# Patient Record
Sex: FEMALE | Race: OTHER | NOT HISPANIC OR LATINO | URBAN - METROPOLITAN AREA
[De-identification: names, ages, dates, MRNs, and addresses within clinical notes are randomized per-mention and may not be internally consistent; named-entity substitution may affect disease eponyms.]

---

## 2017-03-29 ENCOUNTER — EMERGENCY (EMERGENCY)
Facility: HOSPITAL | Age: 37
LOS: 1 days | Discharge: HOME | End: 2017-03-29
Attending: EMERGENCY MEDICINE | Admitting: EMERGENCY MEDICINE
Payer: COMMERCIAL

## 2017-03-29 VITALS
TEMPERATURE: 98 F | OXYGEN SATURATION: 100 % | SYSTOLIC BLOOD PRESSURE: 95 MMHG | HEART RATE: 105 BPM | RESPIRATION RATE: 20 BRPM | DIASTOLIC BLOOD PRESSURE: 56 MMHG

## 2017-03-29 LAB
ALBUMIN SERPL ELPH-MCNC: 4.2 G/DL — SIGNIFICANT CHANGE UP (ref 3.3–5)
ALP SERPL-CCNC: 50 U/L — SIGNIFICANT CHANGE UP (ref 40–120)
ALT FLD-CCNC: 14 U/L — SIGNIFICANT CHANGE UP (ref 4–33)
APPEARANCE UR: SIGNIFICANT CHANGE UP
AST SERPL-CCNC: 21 U/L — SIGNIFICANT CHANGE UP (ref 4–32)
BACTERIA # UR AUTO: SIGNIFICANT CHANGE UP
BASE EXCESS BLDV CALC-SCNC: 0.5 MMOL/L — SIGNIFICANT CHANGE UP
BASOPHILS # BLD AUTO: 0 K/UL — SIGNIFICANT CHANGE UP (ref 0–0.2)
BASOPHILS NFR BLD AUTO: 0 % — SIGNIFICANT CHANGE UP (ref 0–2)
BILIRUB SERPL-MCNC: 0.3 MG/DL — SIGNIFICANT CHANGE UP (ref 0.2–1.2)
BILIRUB UR-MCNC: NEGATIVE — SIGNIFICANT CHANGE UP
BLOOD GAS VENOUS - CREATININE: 0.81 MG/DL — SIGNIFICANT CHANGE UP (ref 0.5–1.3)
BLOOD UR QL VISUAL: HIGH
BUN SERPL-MCNC: 16 MG/DL — SIGNIFICANT CHANGE UP (ref 7–23)
CALCIUM SERPL-MCNC: 8.4 MG/DL — SIGNIFICANT CHANGE UP (ref 8.4–10.5)
CHLORIDE BLDV-SCNC: 106 MMOL/L — SIGNIFICANT CHANGE UP (ref 96–108)
CHLORIDE SERPL-SCNC: 99 MMOL/L — SIGNIFICANT CHANGE UP (ref 98–107)
CO2 SERPL-SCNC: 22 MMOL/L — SIGNIFICANT CHANGE UP (ref 22–31)
COLOR SPEC: SIGNIFICANT CHANGE UP
CREAT SERPL-MCNC: 0.82 MG/DL — SIGNIFICANT CHANGE UP (ref 0.5–1.3)
D DIMER BLD IA.RAPID-MCNC: 351 NG/ML — SIGNIFICANT CHANGE UP
EOSINOPHIL # BLD AUTO: 0 K/UL — SIGNIFICANT CHANGE UP (ref 0–0.5)
EOSINOPHIL NFR BLD AUTO: 0 % — SIGNIFICANT CHANGE UP (ref 0–6)
GAS PNL BLDV: 130 MMOL/L — LOW (ref 136–146)
GLUCOSE BLDV-MCNC: 116 — HIGH (ref 70–99)
GLUCOSE SERPL-MCNC: 114 MG/DL — HIGH (ref 70–99)
GLUCOSE UR-MCNC: NEGATIVE — SIGNIFICANT CHANGE UP
HCO3 BLDV-SCNC: 24 MMOL/L — SIGNIFICANT CHANGE UP (ref 20–27)
HCT VFR BLD CALC: 31.7 % — LOW (ref 34.5–45)
HCT VFR BLDV CALC: 27.6 % — LOW (ref 34.5–45)
HGB BLD-MCNC: 10 G/DL — LOW (ref 11.5–15.5)
HGB BLDV-MCNC: 8.9 G/DL — LOW (ref 11.5–15.5)
IMM GRANULOCYTES NFR BLD AUTO: 0.3 % — SIGNIFICANT CHANGE UP (ref 0–1.5)
KETONES UR-MCNC: NEGATIVE — SIGNIFICANT CHANGE UP
LACTATE BLDV-MCNC: 1.6 MMOL/L — SIGNIFICANT CHANGE UP (ref 0.5–2)
LEUKOCYTE ESTERASE UR-ACNC: NEGATIVE — SIGNIFICANT CHANGE UP
LIDOCAIN IGE QN: 14.7 U/L — SIGNIFICANT CHANGE UP (ref 7–60)
LYMPHOCYTES # BLD AUTO: 0.74 K/UL — LOW (ref 1–3.3)
LYMPHOCYTES # BLD AUTO: 7.8 % — LOW (ref 13–44)
MCHC RBC-ENTMCNC: 24.2 PG — LOW (ref 27–34)
MCHC RBC-ENTMCNC: 31.5 % — LOW (ref 32–36)
MCV RBC AUTO: 76.8 FL — LOW (ref 80–100)
MONOCYTES # BLD AUTO: 0.48 K/UL — SIGNIFICANT CHANGE UP (ref 0–0.9)
MONOCYTES NFR BLD AUTO: 5.1 % — SIGNIFICANT CHANGE UP (ref 2–14)
MUCOUS THREADS # UR AUTO: SIGNIFICANT CHANGE UP
NEUTROPHILS # BLD AUTO: 8.22 K/UL — HIGH (ref 1.8–7.4)
NEUTROPHILS NFR BLD AUTO: 86.8 % — HIGH (ref 43–77)
NITRITE UR-MCNC: NEGATIVE — SIGNIFICANT CHANGE UP
PCO2 BLDV: 45 MMHG — SIGNIFICANT CHANGE UP (ref 41–51)
PH BLDV: 7.37 PH — SIGNIFICANT CHANGE UP (ref 7.32–7.43)
PH UR: 6.5 — SIGNIFICANT CHANGE UP (ref 4.6–8)
PLATELET # BLD AUTO: 292 K/UL — SIGNIFICANT CHANGE UP (ref 150–400)
PMV BLD: 10.1 FL — SIGNIFICANT CHANGE UP (ref 7–13)
PO2 BLDV: 29 MMHG — LOW (ref 35–40)
POTASSIUM BLDV-SCNC: 3.5 MMOL/L — SIGNIFICANT CHANGE UP (ref 3.4–4.5)
POTASSIUM SERPL-MCNC: 3.9 MMOL/L — SIGNIFICANT CHANGE UP (ref 3.5–5.3)
POTASSIUM SERPL-SCNC: 3.9 MMOL/L — SIGNIFICANT CHANGE UP (ref 3.5–5.3)
PROT SERPL-MCNC: 7.6 G/DL — SIGNIFICANT CHANGE UP (ref 6–8.3)
PROT UR-MCNC: 20 — SIGNIFICANT CHANGE UP
RBC # BLD: 4.13 M/UL — SIGNIFICANT CHANGE UP (ref 3.8–5.2)
RBC # FLD: 16.4 % — HIGH (ref 10.3–14.5)
RBC CASTS # UR COMP ASSIST: SIGNIFICANT CHANGE UP (ref 0–?)
SAO2 % BLDV: 46.4 % — LOW (ref 60–85)
SODIUM SERPL-SCNC: 138 MMOL/L — SIGNIFICANT CHANGE UP (ref 135–145)
SP GR SPEC: 1.01 — SIGNIFICANT CHANGE UP (ref 1–1.03)
SQUAMOUS # UR AUTO: SIGNIFICANT CHANGE UP
UROBILINOGEN FLD QL: NORMAL E.U. — SIGNIFICANT CHANGE UP (ref 0.1–0.2)
WBC # BLD: 9.47 K/UL — SIGNIFICANT CHANGE UP (ref 3.8–10.5)
WBC # FLD AUTO: 9.47 K/UL — SIGNIFICANT CHANGE UP (ref 3.8–10.5)
WBC UR QL: SIGNIFICANT CHANGE UP (ref 0–?)

## 2017-03-29 PROCEDURE — 71275 CT ANGIOGRAPHY CHEST: CPT | Mod: 26

## 2017-03-29 PROCEDURE — 99285 EMERGENCY DEPT VISIT HI MDM: CPT | Mod: 25

## 2017-03-29 PROCEDURE — 71020: CPT | Mod: 26

## 2017-03-29 RX ORDER — SODIUM CHLORIDE 9 MG/ML
1000 INJECTION INTRAMUSCULAR; INTRAVENOUS; SUBCUTANEOUS ONCE
Qty: 0 | Refills: 0 | Status: COMPLETED | OUTPATIENT
Start: 2017-03-29 | End: 2017-03-29

## 2017-03-29 RX ADMIN — SODIUM CHLORIDE 1000 MILLILITER(S): 9 INJECTION INTRAMUSCULAR; INTRAVENOUS; SUBCUTANEOUS at 18:52

## 2017-03-29 NOTE — ED ADULT NURSE REASSESSMENT NOTE - NS ED NURSE REASSESS COMMENT FT1
report taking from previous RN for mult. syncope in the past and 3 episode today, denies HA,CP, + dizziness lab sent S.L.. to right Ac 20g angio cath IV fluids in progress, pending dispo.     Sarah Zaragoza RN

## 2017-03-29 NOTE — ED PROVIDER NOTE - OBJECTIVE STATEMENT
35 y/o F with PMH of anemia p/w syncope x 3 earlier today. Pt reports sitting, feeling nauseous and diaphoretic, LOC for 30 seconds with complete return to baseline. Pt also c/o dull achy epigastric pain that has resolved earlier in the day. Pt denies fevers, chills, cough, dysuria, hematuria, hx of dvt/pe, back pain, diarrhea, constipation, SOB, dyspnea on exertion. Pt reports being on her period currently, but states that she only has mild bleeding, no hx of heavy periods.

## 2017-03-29 NOTE — ED ADULT TRIAGE NOTE - CHIEF COMPLAINT QUOTE
Pt states has history of amenia ,states woke up with abdominal pain and nausea last night but did not vomit . Pt reports passing out today . Pt is hypotensive in triage b/p 95/56. Pt is currently on her menstrual cycle but states is not bleeding more then usual.

## 2017-03-29 NOTE — ED PROVIDER NOTE - ATTENDING CONTRIBUTION TO CARE
Locurto  pt presents with syncope x 3 assoc with nausea  no CP or palpitations  no diarrhea or rectal bleeding  no medications  Pt with regular menses  regular po intake no polyuria  Pt had mild abd pain early this am but not at time of dizziness  Exam pt in NAD clear lungs  card S1S2  no m abd benign   no focal strength defect   pt significantly orthostatic  on exam   110/54  96 supine  83/51 126 standing  felt weak Locurto  pt presents with syncope x 3 assoc with nausea  no CP or palpitations  no diarrhea or rectal bleeding  no medications  Pt with regular menses  regular po intake no polyuria  Pt had mild abd pain early this am but not at time of dizziness  Exam pt in NAD clear lungs  card S1S2  no m abd benign   no focal strength defect   pt significantly orthostatic  on exam   110/54  96 supine  83/51 126 standing  felt weak  bedside ECHO unremarkable, FAST negative  d dimer positive  no PE  admit to obs for monitoring continued gentle hydration and am vitals and cortisol  pt orthostatic  no story for volume loss infection  allergic rxn        CT negative ECHO not suggestive of LV dysfunction

## 2017-03-30 VITALS
HEART RATE: 81 BPM | DIASTOLIC BLOOD PRESSURE: 72 MMHG | SYSTOLIC BLOOD PRESSURE: 116 MMHG | TEMPERATURE: 99 F | OXYGEN SATURATION: 100 % | RESPIRATION RATE: 16 BRPM

## 2017-03-30 DIAGNOSIS — Z98.891 HISTORY OF UTERINE SCAR FROM PREVIOUS SURGERY: Chronic | ICD-10-CM

## 2017-03-30 LAB
BASOPHILS # BLD AUTO: 0 K/UL — SIGNIFICANT CHANGE UP (ref 0–0.2)
BASOPHILS NFR BLD AUTO: 0 % — SIGNIFICANT CHANGE UP (ref 0–2)
BUN SERPL-MCNC: 9 MG/DL — SIGNIFICANT CHANGE UP (ref 7–23)
CALCIUM SERPL-MCNC: 7.6 MG/DL — LOW (ref 8.4–10.5)
CHLORIDE SERPL-SCNC: 108 MMOL/L — HIGH (ref 98–107)
CK MB BLD-MCNC: 1 NG/ML — SIGNIFICANT CHANGE UP (ref 1–4.7)
CK MB BLD-MCNC: 1 NG/ML — SIGNIFICANT CHANGE UP (ref 1–4.7)
CK MB BLD-MCNC: SIGNIFICANT CHANGE UP (ref 0–2.5)
CK MB BLD-MCNC: SIGNIFICANT CHANGE UP (ref 0–2.5)
CK SERPL-CCNC: 46 U/L — SIGNIFICANT CHANGE UP (ref 25–170)
CK SERPL-CCNC: 60 U/L — SIGNIFICANT CHANGE UP (ref 25–170)
CO2 SERPL-SCNC: 22 MMOL/L — SIGNIFICANT CHANGE UP (ref 22–31)
CORTIS SERPL-MCNC: 5.3 UG/DL — SIGNIFICANT CHANGE UP (ref 2.7–18.4)
CREAT SERPL-MCNC: 0.62 MG/DL — SIGNIFICANT CHANGE UP (ref 0.5–1.3)
EOSINOPHIL # BLD AUTO: 0.05 K/UL — SIGNIFICANT CHANGE UP (ref 0–0.5)
EOSINOPHIL NFR BLD AUTO: 1 % — SIGNIFICANT CHANGE UP (ref 0–6)
GLUCOSE SERPL-MCNC: 94 MG/DL — SIGNIFICANT CHANGE UP (ref 70–99)
HCT VFR BLD CALC: 28.5 % — LOW (ref 34.5–45)
HGB BLD-MCNC: 9 G/DL — LOW (ref 11.5–15.5)
IMM GRANULOCYTES NFR BLD AUTO: 0 % — SIGNIFICANT CHANGE UP (ref 0–1.5)
LYMPHOCYTES # BLD AUTO: 1.34 K/UL — SIGNIFICANT CHANGE UP (ref 1–3.3)
LYMPHOCYTES # BLD AUTO: 25.8 % — SIGNIFICANT CHANGE UP (ref 13–44)
MCHC RBC-ENTMCNC: 24.7 PG — LOW (ref 27–34)
MCHC RBC-ENTMCNC: 31.6 % — LOW (ref 32–36)
MCV RBC AUTO: 78.3 FL — LOW (ref 80–100)
MONOCYTES # BLD AUTO: 0.29 K/UL — SIGNIFICANT CHANGE UP (ref 0–0.9)
MONOCYTES NFR BLD AUTO: 5.6 % — SIGNIFICANT CHANGE UP (ref 2–14)
NEUTROPHILS # BLD AUTO: 3.52 K/UL — SIGNIFICANT CHANGE UP (ref 1.8–7.4)
NEUTROPHILS NFR BLD AUTO: 67.6 % — SIGNIFICANT CHANGE UP (ref 43–77)
OB PNL STL: NEGATIVE — SIGNIFICANT CHANGE UP
PLATELET # BLD AUTO: 251 K/UL — SIGNIFICANT CHANGE UP (ref 150–400)
PMV BLD: 10.2 FL — SIGNIFICANT CHANGE UP (ref 7–13)
POTASSIUM SERPL-MCNC: 3.4 MMOL/L — LOW (ref 3.5–5.3)
POTASSIUM SERPL-SCNC: 3.4 MMOL/L — LOW (ref 3.5–5.3)
RBC # BLD: 3.64 M/UL — LOW (ref 3.8–5.2)
RBC # FLD: 16.8 % — HIGH (ref 10.3–14.5)
SODIUM SERPL-SCNC: 144 MMOL/L — SIGNIFICANT CHANGE UP (ref 135–145)
TROPONIN T SERPL-MCNC: < 0.06 NG/ML — SIGNIFICANT CHANGE UP (ref 0–0.06)
TROPONIN T SERPL-MCNC: < 0.06 NG/ML — SIGNIFICANT CHANGE UP (ref 0–0.06)
TSH SERPL-MCNC: 0.8 UIU/ML — SIGNIFICANT CHANGE UP (ref 0.27–4.2)
WBC # BLD: 5.2 K/UL — SIGNIFICANT CHANGE UP (ref 3.8–10.5)
WBC # FLD AUTO: 5.2 K/UL — SIGNIFICANT CHANGE UP (ref 3.8–10.5)

## 2017-03-30 PROCEDURE — 76705 ECHO EXAM OF ABDOMEN: CPT | Mod: 26

## 2017-03-30 PROCEDURE — 99234 HOSP IP/OBS SM DT SF/LOW 45: CPT | Mod: 25

## 2017-03-30 PROCEDURE — 93308 TTE F-UP OR LMTD: CPT | Mod: 26

## 2017-03-30 PROCEDURE — 93971 EXTREMITY STUDY: CPT | Mod: 26,LT

## 2017-03-30 PROCEDURE — 93306 TTE W/DOPPLER COMPLETE: CPT | Mod: 26

## 2017-03-30 RX ORDER — SODIUM CHLORIDE 9 MG/ML
1000 INJECTION INTRAMUSCULAR; INTRAVENOUS; SUBCUTANEOUS
Qty: 0 | Refills: 0 | Status: DISCONTINUED | OUTPATIENT
Start: 2017-03-30 | End: 2017-04-02

## 2017-03-30 RX ORDER — FAMOTIDINE 10 MG/ML
20 INJECTION INTRAVENOUS ONCE
Qty: 0 | Refills: 0 | Status: COMPLETED | OUTPATIENT
Start: 2017-03-30 | End: 2017-03-30

## 2017-03-30 RX ADMIN — SODIUM CHLORIDE 125 MILLILITER(S): 9 INJECTION INTRAMUSCULAR; INTRAVENOUS; SUBCUTANEOUS at 01:30

## 2017-03-30 RX ADMIN — SODIUM CHLORIDE 125 MILLILITER(S): 9 INJECTION INTRAMUSCULAR; INTRAVENOUS; SUBCUTANEOUS at 10:00

## 2017-03-30 RX ADMIN — FAMOTIDINE 20 MILLIGRAM(S): 10 INJECTION INTRAVENOUS at 15:30

## 2017-03-30 NOTE — ED CDU PROVIDER NOTE - PROGRESS NOTE DETAILS
Yolie RUSSELL: CDU PROGRESS NOTE: pt feels much better, no overnight issues, pending echo Yolie RUSSELL: CDU dispo NOTE: Pt has normal echo and neg us of rt leg, no cause of recurrent syncope was eluded. I have extensive discussion with patient and gave option to stay fro further testing vs outpatient testing and hydration. Pt chose outpatient testing, advised to have holter monitoring, drink fluids like gatorade and coconut water and in c/o worsening symptoms or recurrent syncope return to the ED. Pt was also told that she might need EP study in the future DIAMOND Finnegan: Results and discharge instructions reviewed with patient. Pt agrees to follow up for Holter monitor and further studies as outpatients. Pt understands to increase her fluid and return precautions. Pt feels well. VSS. Pt stable for discharge.

## 2017-03-30 NOTE — ED CDU PROVIDER NOTE - PLAN OF CARE
Follow up with your Primary Medical Doctor/ and or Cardiology within 2-3days for Holter monitor and possible EP study in future. If results or reports were given to you, show copies of your reports given to you. Take all of your medications as previously prescribed. Increase fluid intake, drink Gatorade, coconut water. If any worsening, concerning or new symptoms return to the ED.

## 2017-03-30 NOTE — ED CDU PROVIDER NOTE - MEDICAL DECISION MAKING DETAILS
Pt. was evaluated in the ED and sent to CDU for cardiac telemetry monitoring, CE #2, echo, am cortisol level, observation and reassessment.  At present, pt...

## 2017-03-30 NOTE — ED CDU PROVIDER NOTE - ATTENDING CONTRIBUTION TO CARE
Locurto  pt placed in CDU s/p syncope   Pt presented with orthostatic hypotension and nonspecific TW changes on EKG  no clear clinical cause of volume depletion exam otherwise unremarkable with benign abd   nl cardiopulmonary exam  nonfocal neuro  bedside FAST negative positive dimer with CTA negative for PE  bedside ECHO and labs nondiagnostic    CDU for monitoring   further evaluation Locurto  pt placed in CDU s/p syncope   Pt presented with orthostatic hypotension and nonspecific TW changes on EKG  no clear clinical cause of volume depletion exam otherwise unremarkable with benign abd   nl cardiopulmonary exam  nonfocal neuro  bedside FAST negative positive dimer with CTA negative for PE  bedside ECHO and labs nondiagnostic    CDU for monitoring   further evaluation repeat CBC am cortisol

## 2017-03-30 NOTE — ED CDU PROVIDER NOTE - OBJECTIVE STATEMENT
35 y/o F PMH anemia c/o 3 episodes of syncope today. Pt states at approx 0300 this am she experienced epigastric pain and nausea. After walking to the bathroom pt began to feel cold sweats and chills and guided herself to seated position before losing consciousness. Pt states she had LOC for few seconds. At approx 1200 pt had similar episode witnessed by mother in law, again no head trauma, able to guide self to floor. While waiting in ED triage pt had 3rd episode of syncope while sitting in chairs. Denies fever, CP, SOB, vomiting, diarrhea, dysuria, recent travel, calf pain/LE edema, hormone use, visual changes.  At present pt is asymptomatic and epigastric pain completely resolved.

## 2017-03-30 NOTE — ED PROCEDURE NOTE - PROCEDURE ADDITIONAL DETAILS
grossly nl LV function fractional shortening 37%  nl rv free wall velocity  RV/LV 83%  no septal D  IVC not dilated  no effusion  no JOSE MANUEL seen

## 2017-04-04 PROBLEM — Z00.00 ENCOUNTER FOR PREVENTIVE HEALTH EXAMINATION: Status: ACTIVE | Noted: 2017-04-04

## 2017-04-05 ENCOUNTER — APPOINTMENT (OUTPATIENT)
Dept: SURGERY | Facility: CLINIC | Age: 37
End: 2017-04-05

## 2017-04-05 ENCOUNTER — LABORATORY RESULT (OUTPATIENT)
Age: 37
End: 2017-04-05

## 2017-04-05 VITALS
WEIGHT: 170 LBS | HEIGHT: 64 IN | DIASTOLIC BLOOD PRESSURE: 74 MMHG | HEART RATE: 81 BPM | BODY MASS INDEX: 29.02 KG/M2 | SYSTOLIC BLOOD PRESSURE: 132 MMHG

## 2017-04-05 DIAGNOSIS — K80.20 CALCULUS OF GALLBLADDER W/OUT CHOLECYSTITIS W/OUT OBSTRUCTION: ICD-10-CM

## 2017-04-05 DIAGNOSIS — E04.2 NONTOXIC MULTINODULAR GOITER: ICD-10-CM

## 2017-04-11 ENCOUNTER — TRANSCRIPTION ENCOUNTER (OUTPATIENT)
Age: 37
End: 2017-04-11

## 2017-10-11 ENCOUNTER — APPOINTMENT (OUTPATIENT)
Dept: SURGERY | Facility: CLINIC | Age: 37
End: 2017-10-11

## 2019-10-13 ENCOUNTER — TRANSCRIPTION ENCOUNTER (OUTPATIENT)
Age: 39
End: 2019-10-13

## 2019-10-13 ENCOUNTER — INPATIENT (INPATIENT)
Facility: HOSPITAL | Age: 39
LOS: 1 days | Discharge: OTHER INSTITUTION | DRG: 419 | End: 2019-10-15
Attending: SURGERY | Admitting: SURGERY
Payer: COMMERCIAL

## 2019-10-13 VITALS
HEART RATE: 62 BPM | WEIGHT: 169.98 LBS | RESPIRATION RATE: 18 BRPM | HEIGHT: 64 IN | SYSTOLIC BLOOD PRESSURE: 159 MMHG | DIASTOLIC BLOOD PRESSURE: 85 MMHG | OXYGEN SATURATION: 100 % | TEMPERATURE: 99 F

## 2019-10-13 DIAGNOSIS — Z98.891 HISTORY OF UTERINE SCAR FROM PREVIOUS SURGERY: Chronic | ICD-10-CM

## 2019-10-13 DIAGNOSIS — K80.20 CALCULUS OF GALLBLADDER WITHOUT CHOLECYSTITIS WITHOUT OBSTRUCTION: ICD-10-CM

## 2019-10-13 PROBLEM — D64.9 ANEMIA, UNSPECIFIED: Chronic | Status: ACTIVE | Noted: 2017-03-29

## 2019-10-13 LAB
ALBUMIN SERPL ELPH-MCNC: 4.2 G/DL — SIGNIFICANT CHANGE UP (ref 3.3–5)
ALP SERPL-CCNC: 55 U/L — SIGNIFICANT CHANGE UP (ref 40–120)
ALT FLD-CCNC: 17 U/L — SIGNIFICANT CHANGE UP (ref 10–45)
ANION GAP SERPL CALC-SCNC: 13 MMOL/L — SIGNIFICANT CHANGE UP (ref 5–17)
AST SERPL-CCNC: 17 U/L — SIGNIFICANT CHANGE UP (ref 10–40)
BASOPHILS # BLD AUTO: 0.01 K/UL — SIGNIFICANT CHANGE UP (ref 0–0.2)
BASOPHILS NFR BLD AUTO: 0.1 % — SIGNIFICANT CHANGE UP (ref 0–2)
BILIRUB SERPL-MCNC: 0.2 MG/DL — SIGNIFICANT CHANGE UP (ref 0.2–1.2)
BUN SERPL-MCNC: 10 MG/DL — SIGNIFICANT CHANGE UP (ref 7–23)
CALCIUM SERPL-MCNC: 9.1 MG/DL — SIGNIFICANT CHANGE UP (ref 8.4–10.5)
CHLORIDE SERPL-SCNC: 100 MMOL/L — SIGNIFICANT CHANGE UP (ref 96–108)
CO2 SERPL-SCNC: 21 MMOL/L — LOW (ref 22–31)
CREAT SERPL-MCNC: 0.71 MG/DL — SIGNIFICANT CHANGE UP (ref 0.5–1.3)
EOSINOPHIL # BLD AUTO: 0.09 K/UL — SIGNIFICANT CHANGE UP (ref 0–0.5)
EOSINOPHIL NFR BLD AUTO: 1.1 % — SIGNIFICANT CHANGE UP (ref 0–6)
GLUCOSE SERPL-MCNC: 107 MG/DL — HIGH (ref 70–99)
HCT VFR BLD CALC: 35.1 % — SIGNIFICANT CHANGE UP (ref 34.5–45)
HGB BLD-MCNC: 11.3 G/DL — LOW (ref 11.5–15.5)
IMM GRANULOCYTES NFR BLD AUTO: 0.5 % — SIGNIFICANT CHANGE UP (ref 0–1.5)
LIDOCAIN IGE QN: 16 U/L — SIGNIFICANT CHANGE UP (ref 7–60)
LYMPHOCYTES # BLD AUTO: 1.5 K/UL — SIGNIFICANT CHANGE UP (ref 1–3.3)
LYMPHOCYTES # BLD AUTO: 18.7 % — SIGNIFICANT CHANGE UP (ref 13–44)
MCHC RBC-ENTMCNC: 25.9 PG — LOW (ref 27–34)
MCHC RBC-ENTMCNC: 32.2 GM/DL — SIGNIFICANT CHANGE UP (ref 32–36)
MCV RBC AUTO: 80.3 FL — SIGNIFICANT CHANGE UP (ref 80–100)
MONOCYTES # BLD AUTO: 0.45 K/UL — SIGNIFICANT CHANGE UP (ref 0–0.9)
MONOCYTES NFR BLD AUTO: 5.6 % — SIGNIFICANT CHANGE UP (ref 2–14)
NEUTROPHILS # BLD AUTO: 5.93 K/UL — SIGNIFICANT CHANGE UP (ref 1.8–7.4)
NEUTROPHILS NFR BLD AUTO: 74 % — SIGNIFICANT CHANGE UP (ref 43–77)
NRBC # BLD: 0 /100 WBCS — SIGNIFICANT CHANGE UP (ref 0–0)
PLATELET # BLD AUTO: 310 K/UL — SIGNIFICANT CHANGE UP (ref 150–400)
POTASSIUM SERPL-MCNC: 4 MMOL/L — SIGNIFICANT CHANGE UP (ref 3.5–5.3)
POTASSIUM SERPL-SCNC: 4 MMOL/L — SIGNIFICANT CHANGE UP (ref 3.5–5.3)
PROT SERPL-MCNC: 7.5 G/DL — SIGNIFICANT CHANGE UP (ref 6–8.3)
RBC # BLD: 4.37 M/UL — SIGNIFICANT CHANGE UP (ref 3.8–5.2)
RBC # FLD: 14.9 % — HIGH (ref 10.3–14.5)
SODIUM SERPL-SCNC: 134 MMOL/L — LOW (ref 135–145)
WBC # BLD: 8.02 K/UL — SIGNIFICANT CHANGE UP (ref 3.8–10.5)
WBC # FLD AUTO: 8.02 K/UL — SIGNIFICANT CHANGE UP (ref 3.8–10.5)

## 2019-10-13 PROCEDURE — 99285 EMERGENCY DEPT VISIT HI MDM: CPT

## 2019-10-13 PROCEDURE — 76705 ECHO EXAM OF ABDOMEN: CPT | Mod: 26,RT

## 2019-10-13 RX ORDER — CEFOTETAN DISODIUM 1 G
VIAL (EA) INJECTION
Refills: 0 | Status: DISCONTINUED | OUTPATIENT
Start: 2019-10-13 | End: 2019-10-14

## 2019-10-13 RX ORDER — ACETAMINOPHEN 500 MG
1000 TABLET ORAL ONCE
Refills: 0 | Status: COMPLETED | OUTPATIENT
Start: 2019-10-13 | End: 2019-10-13

## 2019-10-13 RX ORDER — ENOXAPARIN SODIUM 100 MG/ML
40 INJECTION SUBCUTANEOUS DAILY
Refills: 0 | Status: DISCONTINUED | OUTPATIENT
Start: 2019-10-13 | End: 2019-10-14

## 2019-10-13 RX ORDER — METOCLOPRAMIDE HCL 10 MG
10 TABLET ORAL ONCE
Refills: 0 | Status: DISCONTINUED | OUTPATIENT
Start: 2019-10-13 | End: 2019-10-13

## 2019-10-13 RX ORDER — ONDANSETRON 8 MG/1
4 TABLET, FILM COATED ORAL ONCE
Refills: 0 | Status: DISCONTINUED | OUTPATIENT
Start: 2019-10-13 | End: 2019-10-13

## 2019-10-13 RX ORDER — SODIUM CHLORIDE 9 MG/ML
1000 INJECTION, SOLUTION INTRAVENOUS
Refills: 0 | Status: DISCONTINUED | OUTPATIENT
Start: 2019-10-13 | End: 2019-10-14

## 2019-10-13 RX ORDER — HYDROMORPHONE HYDROCHLORIDE 2 MG/ML
1 INJECTION INTRAMUSCULAR; INTRAVENOUS; SUBCUTANEOUS EVERY 6 HOURS
Refills: 0 | Status: DISCONTINUED | OUTPATIENT
Start: 2019-10-13 | End: 2019-10-14

## 2019-10-13 RX ORDER — CEFOTETAN DISODIUM 1 G
2 VIAL (EA) INJECTION ONCE
Refills: 0 | Status: COMPLETED | OUTPATIENT
Start: 2019-10-13 | End: 2019-10-13

## 2019-10-13 RX ORDER — HYDROMORPHONE HYDROCHLORIDE 2 MG/ML
0.5 INJECTION INTRAMUSCULAR; INTRAVENOUS; SUBCUTANEOUS EVERY 6 HOURS
Refills: 0 | Status: DISCONTINUED | OUTPATIENT
Start: 2019-10-13 | End: 2019-10-14

## 2019-10-13 RX ORDER — INFLUENZA VIRUS VACCINE 15; 15; 15; 15 UG/.5ML; UG/.5ML; UG/.5ML; UG/.5ML
0.5 SUSPENSION INTRAMUSCULAR ONCE
Refills: 0 | Status: DISCONTINUED | OUTPATIENT
Start: 2019-10-13 | End: 2019-10-15

## 2019-10-13 RX ORDER — LEVOTHYROXINE SODIUM 125 MCG
1 TABLET ORAL
Qty: 0 | Refills: 0 | DISCHARGE

## 2019-10-13 RX ORDER — CEFOTETAN DISODIUM 1 G
2 VIAL (EA) INJECTION EVERY 12 HOURS
Refills: 0 | Status: DISCONTINUED | OUTPATIENT
Start: 2019-10-14 | End: 2019-10-14

## 2019-10-13 RX ORDER — HYDROMORPHONE HYDROCHLORIDE 2 MG/ML
1 INJECTION INTRAMUSCULAR; INTRAVENOUS; SUBCUTANEOUS ONCE
Refills: 0 | Status: DISCONTINUED | OUTPATIENT
Start: 2019-10-13 | End: 2019-10-13

## 2019-10-13 RX ORDER — METOCLOPRAMIDE HCL 10 MG
10 TABLET ORAL ONCE
Refills: 0 | Status: COMPLETED | OUTPATIENT
Start: 2019-10-13 | End: 2019-10-13

## 2019-10-13 RX ORDER — LEVOTHYROXINE SODIUM 125 MCG
137 TABLET ORAL DAILY
Refills: 0 | Status: DISCONTINUED | OUTPATIENT
Start: 2019-10-13 | End: 2019-10-14

## 2019-10-13 RX ORDER — MORPHINE SULFATE 50 MG/1
4 CAPSULE, EXTENDED RELEASE ORAL ONCE
Refills: 0 | Status: DISCONTINUED | OUTPATIENT
Start: 2019-10-13 | End: 2019-10-13

## 2019-10-13 RX ORDER — ONDANSETRON 8 MG/1
4 TABLET, FILM COATED ORAL ONCE
Refills: 0 | Status: DISCONTINUED | OUTPATIENT
Start: 2019-10-13 | End: 2019-10-14

## 2019-10-13 RX ORDER — ONDANSETRON 8 MG/1
4 TABLET, FILM COATED ORAL ONCE
Refills: 0 | Status: COMPLETED | OUTPATIENT
Start: 2019-10-13 | End: 2019-10-13

## 2019-10-13 RX ORDER — SODIUM CHLORIDE 9 MG/ML
1000 INJECTION INTRAMUSCULAR; INTRAVENOUS; SUBCUTANEOUS ONCE
Refills: 0 | Status: COMPLETED | OUTPATIENT
Start: 2019-10-13 | End: 2019-10-13

## 2019-10-13 RX ADMIN — Medication 10 MILLIGRAM(S): at 17:59

## 2019-10-13 RX ADMIN — Medication 100 GRAM(S): at 19:31

## 2019-10-13 RX ADMIN — MORPHINE SULFATE 4 MILLIGRAM(S): 50 CAPSULE, EXTENDED RELEASE ORAL at 12:48

## 2019-10-13 RX ADMIN — MORPHINE SULFATE 4 MILLIGRAM(S): 50 CAPSULE, EXTENDED RELEASE ORAL at 12:38

## 2019-10-13 RX ADMIN — ONDANSETRON 4 MILLIGRAM(S): 8 TABLET, FILM COATED ORAL at 14:31

## 2019-10-13 RX ADMIN — HYDROMORPHONE HYDROCHLORIDE 1 MILLIGRAM(S): 2 INJECTION INTRAMUSCULAR; INTRAVENOUS; SUBCUTANEOUS at 17:58

## 2019-10-13 RX ADMIN — ONDANSETRON 4 MILLIGRAM(S): 8 TABLET, FILM COATED ORAL at 12:38

## 2019-10-13 RX ADMIN — SODIUM CHLORIDE 125 MILLILITER(S): 9 INJECTION, SOLUTION INTRAVENOUS at 22:15

## 2019-10-13 RX ADMIN — HYDROMORPHONE HYDROCHLORIDE 0.5 MILLIGRAM(S): 2 INJECTION INTRAMUSCULAR; INTRAVENOUS; SUBCUTANEOUS at 19:39

## 2019-10-13 RX ADMIN — MORPHINE SULFATE 4 MILLIGRAM(S): 50 CAPSULE, EXTENDED RELEASE ORAL at 14:45

## 2019-10-13 RX ADMIN — MORPHINE SULFATE 4 MILLIGRAM(S): 50 CAPSULE, EXTENDED RELEASE ORAL at 14:31

## 2019-10-13 RX ADMIN — Medication 400 MILLIGRAM(S): at 22:14

## 2019-10-13 RX ADMIN — Medication 1000 MILLIGRAM(S): at 22:44

## 2019-10-13 RX ADMIN — SODIUM CHLORIDE 1000 MILLILITER(S): 9 INJECTION INTRAMUSCULAR; INTRAVENOUS; SUBCUTANEOUS at 12:38

## 2019-10-13 NOTE — ED PROVIDER NOTE - CLINICAL SUMMARY MEDICAL DECISION MAKING FREE TEXT BOX
38 y/o female presenting to the ED c/o RUQ pain. Exam + for RUQ tenderness and +chaves's suggestive of cholecystitis. Will treat pain and nausea, order labs, and u/s RUQ r/o cholecystitis.

## 2019-10-13 NOTE — ED ADULT NURSE NOTE - OBJECTIVE STATEMENT
39 year old 39 year old female presents to ED c/o RUQ/epigastric pain radiating to right back since yesterday.  She took antacids and advil for pain with mild relief.  She said pain has been constant with waves of intensity and said pain has been severe.  She said pain was burning in nature but burning has subsided.  Patient said she has known gallstones and said when she thinks back a few weeks she has had increased pain after eating.  Patient has had nausea, but denies vomiting.  Patient denies: fevers, chest pain, shortness of breath.

## 2019-10-13 NOTE — H&P ADULT - ASSESSMENT
40 yo female with known gallstone and biliary colic, thyroid cancer s/p thyroidectomy presented with persistent right upper quadrant pain since 4pm yesterday, tender on exam, no leukocytosis, LFT and Lipase WNL, US significant for wall thickening. Given the history, exam and imaging, patient is suspected to have acute appendicitis.     - Patient is admitted to [facility] surgery with anticipated plan for laparoscopic cholecystectomy   - Will start on IV Cefotetan in the interim   - NPO with IVF in anticipation of planned intervention   - Discussed with Dr. Zhang 40 yo female with known gallstone and biliary colic, thyroid cancer s/p thyroidectomy presented with persistent right upper quadrant pain since 4pm yesterday, tender on exam, no leukocytosis, LFT and Lipase WNL, US significant for wall thickening. Given the history, exam and imaging, patient is suspected to have acute appendicitis.     - Patient is admitted to [facility] surgery with anticipated plan for laparoscopic cholecystectomy   - Will start on IV Cefotetan in the interim   - NPO with IVF in anticipation of planned intervention   - Plan discussed with patient and family, agree to proceed with the operative intervention understanding the indication, risk and benefits; signed consent in chart   - Discussed with Dr. Zhang

## 2019-10-13 NOTE — H&P ADULT - HISTORY OF PRESENT ILLNESS
Patient is a 39 years old female with endometriosis on OCP, known gallstone diagnosed 2 years ago with biliary colic, thyroid cancer s/p thyroidectomy years ago presented with acute abdominal pain. She states that her pain started 4 pm yesterday, and has persisted unlike her prior episodes. She described the pain as burning, localized to the right upper quadrant radiating to the back. She denies nausea, vomiting, fever or chills. Of note, she had an EGD 2 years ago by her GI Dr. Mckeon and no ulcer was found.

## 2019-10-13 NOTE — ED PROVIDER NOTE - NS ED ROS FT
CONST: no fevers, no chills  EYES: no pain, no vision changes  ENT: no sore throat, no ear pain, no change in hearing  CV: no chest pain, no leg swelling  RESP: no shortness of breath, no cough  ABD: + abdominal pain, + nausea, no vomiting, no diarrhea  : no dysuria, no flank pain, no hematuria  MSK: no back pain, no extremity pain  NEURO: no headache or additional neurologic complaints  HEME: no easy bleeding  SKIN:  no rash

## 2019-10-13 NOTE — H&P ADULT - NSHPPHYSICALEXAM_GEN_ALL_CORE
• Constitutional: well-developed, well nourished  • Eyes: EOMI; PERRL; no drainage or redness  • ENMT: No oral lesions; no gross abnormalities  • Neck: No bruits; no thyromegaly or nodules   • Breasts: not examined  • Back: not examined   • Respiratory: Breath Sounds equal & clear to percussion & auscultation, no accessory muscle use  • Cardiovascular	: not examined  • Gastrointestinal: Soft, non-tender, no hepatosplenomegaly, normal bowel sounds  • Genitourinary: not examined   • Rectal: not examined   • Extremities: No cyanosis, clubbing or edema   • Vascular: detailed exam   • Neurological: Alert & oriented; no sensory, motor or coordination deficits, normal reflexes  • Skin: No lesions; no rash  • Lymph Nodes: No lymphadedenopathy  • Musculoskeletal: No joint pain, swelling or deformity; no limitation of movement  • Psychiatric: Affect and characteristics of appearance, verbalizations, behaviors are appropriate • Constitutional: well-developed, well nourished  • Eyes: EOMI; PERRL; no drainage or redness  • ENMT: No oral lesions; no gross abnormalities  • Breasts: not examined  • Back: not examined   • Respiratory: unlabored breathing on room air   • Cardiovascular: RRR, S1 and S2 present   • Gastrointestinal: Soft, tender over right upper quadrant   • Genitourinary: not examined   • Rectal: not examined   • Extremities: No cyanosis, clubbing or edema   • Skin: No lesions; no rash  • Lymph Nodes: No lymphadedenopathy  • Musculoskeletal: No joint pain, swelling or deformity; no limitation of movement  • Psychiatric: Affect and characteristics of appearance, verbalizations, behaviors are appropriate

## 2019-10-13 NOTE — ED PROVIDER NOTE - ATTENDING CONTRIBUTION TO CARE
39y F hx gallstones, thyroid CA here with c/o RUQ abd pain radiating to back since yesterday afternoon, severe, constant, no relief with antacids and ibuprofen. No hx of alcohol abuse. No fever. +Nausea. No vomiting. On exam well appearing, uncomfortable, +[place], epigastric TTP. Biliary colic vs acute manuelito vs pancreatitis. Labs, meds, sono.

## 2019-10-13 NOTE — H&P ADULT - NSHPLABSRESULTS_GEN_ALL_CORE
CBC (10-13 @ 12:52)                          11.3<L>                   8.02    )--------------(  310        74.0  % Neuts, 18.7  % Lymphs, ANC: 5.93                            35.1      BMP (10-13 @ 12:52)       134<L>  |  100     |  10    			Ca++ --      Ca 9.1          ---------------------------------( 107<H>		Mg --           4.0     |  21<L>   |  0.71  			Ph --        LFTs (10-13 @ 12:52)      TPro 7.5 / Alb 4.2 / TBili 0.2 / DBili -- / AST 17 / ALT 17 / AlkPhos 55    EXAM:  US ABDOMEN RT UPR QUADRANT                        PROCEDURE DATE:  10/13/2019      EXAM:  US ABDOMEN RT UPR QUADRANT                        PROCEDURE DATE:  10/13/2019      FINDINGS:  Liver: Within normal limits.  Bile ducts: Normal caliber. The common bile duct measures up to 7 mm.  Gallbladder: Multiple nonmobile stone and gallbladder wall thickening   measuring up to 0.6 cm. Patient received pain medication.   Pancreas: Visualized portions are within normal limits.  Right kidney: 11.3 cm. No hydronephrosis.  Ascites: None.  Aorta/IVC: Visualized portions are within normal limits.    IMPRESSION:   Gallstones.

## 2019-10-13 NOTE — ED PROVIDER NOTE - PROGRESS NOTE DETAILS
U/S with gallstones and wall thickening. Contacted surgery who will come see patient. Patient's nausea and pain controlled with 8mg morphine and 8mg zofran. Discussed with surgery who will admit patient

## 2019-10-13 NOTE — ED ADULT NURSE REASSESSMENT NOTE - NS ED NURSE REASSESS COMMENT FT1
Pt in no acute distress. Resting comfortably. Will continue to monitor. Pending bed assignment upstairs.

## 2019-10-13 NOTE — ED PROVIDER NOTE - OBJECTIVE STATEMENT
40 y/o female w/ hx endometriosis and thyroid cancer presenting to the ED c/o RUQ pain. Patient reports pain began in the epigastric region a few days ago and then progressed to the RUQ and radiates to the back. Has some associated nausea and decreased PO intake. No vomiting. No fever or chills. No diarrhea, constipation. Has a hx of gallstones. Also has been worked up by GI for gastritis in the past.

## 2019-10-13 NOTE — PATIENT PROFILE ADULT - FUNCTIONAL SCREEN CURRENT LEVEL: COMMUNICATION, MLM
Discussion/Summary   Thyroid ultrasound demonstrated similar changes from the past follow-up with endocrinology        Verified Results  US THYROID [id number] 01:45PM [address] [place] [address] Provider: Levi Song. Reason For Study: hyperthyroidism without crisis,hyperthyroidism without crisis. Test Name Result Flag Reference   US THYROID (Report)     Accession #    [id number]    EXAM: US THYROID    CLINICAL INDICATION: Hyperthyroidism. History of left thyroid biopsy with negative pathology in   April 2016. COMPARISON: Ultrasound thyroid 04/12/2016, 05/21/2013, 08/10/2015. TECHNIQUE: Multiple sonographic images of the thyroid. FINDINGS:    The thyroid gland is markedly enlarged. The right thyroid measures 7.1 x 4.3 x 2.5 cm. The left   thyroid measures 7.0 x 4.0 x 3.5 cm. The isthmus measures 15 mm in AP dimension. Multiple similar-appearing solid hyperechoic nodules with internal cystic spaces and a few   peripheral calcifications throughout the gland. 2.3 cm solid nodule with internal cystic spaces   in the superior right thyroid. 1.3 cm hypoechoic nodule with peripheral rim calcification   in the superior to mid right thyroid. 3 cm predominantly solid nodule with internal cystic   spaces in the inferior right thyroid extending below the clavicle . 2.1 cm oval wider than tall   homogenously hypoechoic nodule with a thin peripheral hypoechoic halo in the mid to inferior   isthmus. 8 mm peripherally calcified nodule in the mid left thyroid. 2.6 cm homogenously   hyperechoic nodule in the inferior left thyroid. Overall, nodules are not significantly changed compared to prior study although the 3 cm nodule   in the inferior right thyroid was not demonstrated previously, likely obscured by other nodules. However, of note, this nodule appears to have been demonstrated on ultrasound from 05/21/2013   and appears unchanged since then.     IMPRESSION:    Enlarged heterogenous thyroid gland with multiple nodules, compatible with multinodular goiter. No significant change compared to prior studies.     **** F I N A L ****    Transcribed By: LADI   04/12/17 3:19 pm    Dictated By:      Mike De La Paz    Electronically Reviewed and Approved By:      Mike De La Paz 04/12/17 3:30 pm 0 = understands/communicates without difficulty

## 2019-10-13 NOTE — ED PROVIDER NOTE - PHYSICAL EXAMINATION
GENERAL: Awake, alert, NAD  HEENT: NC/AT, moist mucous membranes, PERRL, EOMI  LUNGS: CTAB, no wheezes or crackles   CARDIAC: RRR, no m/r/g  ABDOMEN: Soft, normal BS, +RUQ tenderness and minimal epigastic tenderness, +chaves's sign, non distended  BACK: No midline spinal tenderness, no CVA tenderness  EXT: No edema, no calf tenderness, 2+ DP pulses bilaterally, no deformities.  NEURO: A&Ox3. Moving all extremities.  SKIN: Warm and dry. No rash.  PSYCH: Normal affect.

## 2019-10-14 ENCOUNTER — RESULT REVIEW (OUTPATIENT)
Age: 39
End: 2019-10-14

## 2019-10-14 LAB
ANION GAP SERPL CALC-SCNC: 15 MMOL/L — SIGNIFICANT CHANGE UP (ref 5–17)
APTT BLD: 32.4 SEC — SIGNIFICANT CHANGE UP (ref 27.5–36.3)
BLD GP AB SCN SERPL QL: NEGATIVE — SIGNIFICANT CHANGE UP
BUN SERPL-MCNC: 8 MG/DL — SIGNIFICANT CHANGE UP (ref 7–23)
CALCIUM SERPL-MCNC: 8 MG/DL — LOW (ref 8.4–10.5)
CHLORIDE SERPL-SCNC: 101 MMOL/L — SIGNIFICANT CHANGE UP (ref 96–108)
CO2 SERPL-SCNC: 23 MMOL/L — SIGNIFICANT CHANGE UP (ref 22–31)
CREAT SERPL-MCNC: 0.69 MG/DL — SIGNIFICANT CHANGE UP (ref 0.5–1.3)
GLUCOSE SERPL-MCNC: 94 MG/DL — SIGNIFICANT CHANGE UP (ref 70–99)
HCG SERPL-ACNC: <2 MIU/ML — SIGNIFICANT CHANGE UP
HCT VFR BLD CALC: 32.9 % — LOW (ref 34.5–45)
HGB BLD-MCNC: 10.6 G/DL — LOW (ref 11.5–15.5)
INR BLD: 1.03 RATIO — SIGNIFICANT CHANGE UP (ref 0.88–1.16)
MAGNESIUM SERPL-MCNC: 1.8 MG/DL — SIGNIFICANT CHANGE UP (ref 1.6–2.6)
MCHC RBC-ENTMCNC: 26.1 PG — LOW (ref 27–34)
MCHC RBC-ENTMCNC: 32.2 GM/DL — SIGNIFICANT CHANGE UP (ref 32–36)
MCV RBC AUTO: 81 FL — SIGNIFICANT CHANGE UP (ref 80–100)
PHOSPHATE SERPL-MCNC: 2.7 MG/DL — SIGNIFICANT CHANGE UP (ref 2.5–4.5)
PLATELET # BLD AUTO: 291 K/UL — SIGNIFICANT CHANGE UP (ref 150–400)
POTASSIUM SERPL-MCNC: 3.8 MMOL/L — SIGNIFICANT CHANGE UP (ref 3.5–5.3)
POTASSIUM SERPL-SCNC: 3.8 MMOL/L — SIGNIFICANT CHANGE UP (ref 3.5–5.3)
PROTHROM AB SERPL-ACNC: 11.7 SEC — SIGNIFICANT CHANGE UP (ref 10–13.1)
RBC # BLD: 4.06 M/UL — SIGNIFICANT CHANGE UP (ref 3.8–5.2)
RBC # FLD: 15 % — HIGH (ref 10.3–14.5)
RH IG SCN BLD-IMP: POSITIVE — SIGNIFICANT CHANGE UP
SODIUM SERPL-SCNC: 139 MMOL/L — SIGNIFICANT CHANGE UP (ref 135–145)
WBC # BLD: 8.55 K/UL — SIGNIFICANT CHANGE UP (ref 3.8–10.5)
WBC # FLD AUTO: 8.55 K/UL — SIGNIFICANT CHANGE UP (ref 3.8–10.5)

## 2019-10-14 PROCEDURE — 47562 LAPAROSCOPIC CHOLECYSTECTOMY: CPT

## 2019-10-14 PROCEDURE — 88304 TISSUE EXAM BY PATHOLOGIST: CPT | Mod: 26

## 2019-10-14 RX ORDER — OXYCODONE HYDROCHLORIDE 5 MG/1
5 TABLET ORAL EVERY 4 HOURS
Refills: 0 | Status: DISCONTINUED | OUTPATIENT
Start: 2019-10-14 | End: 2019-10-15

## 2019-10-14 RX ORDER — ENOXAPARIN SODIUM 100 MG/ML
40 INJECTION SUBCUTANEOUS DAILY
Refills: 0 | Status: DISCONTINUED | OUTPATIENT
Start: 2019-10-14 | End: 2019-10-15

## 2019-10-14 RX ORDER — ACETAMINOPHEN 500 MG
650 TABLET ORAL EVERY 6 HOURS
Refills: 0 | Status: DISCONTINUED | OUTPATIENT
Start: 2019-10-14 | End: 2019-10-15

## 2019-10-14 RX ORDER — SODIUM CHLORIDE 9 MG/ML
1000 INJECTION, SOLUTION INTRAVENOUS
Refills: 0 | Status: DISCONTINUED | OUTPATIENT
Start: 2019-10-14 | End: 2019-10-14

## 2019-10-14 RX ORDER — CEFOTETAN DISODIUM 1 G
2 VIAL (EA) INJECTION EVERY 12 HOURS
Refills: 0 | Status: COMPLETED | OUTPATIENT
Start: 2019-10-14 | End: 2019-10-15

## 2019-10-14 RX ORDER — HYDROMORPHONE HYDROCHLORIDE 2 MG/ML
0.5 INJECTION INTRAMUSCULAR; INTRAVENOUS; SUBCUTANEOUS
Refills: 0 | Status: DISCONTINUED | OUTPATIENT
Start: 2019-10-14 | End: 2019-10-14

## 2019-10-14 RX ORDER — OXYCODONE HYDROCHLORIDE 5 MG/1
10 TABLET ORAL EVERY 4 HOURS
Refills: 0 | Status: DISCONTINUED | OUTPATIENT
Start: 2019-10-14 | End: 2019-10-15

## 2019-10-14 RX ORDER — OXYCODONE AND ACETAMINOPHEN 5; 325 MG/1; MG/1
1 TABLET ORAL ONCE
Refills: 0 | Status: DISCONTINUED | OUTPATIENT
Start: 2019-10-14 | End: 2019-10-14

## 2019-10-14 RX ADMIN — OXYCODONE HYDROCHLORIDE 5 MILLIGRAM(S): 5 TABLET ORAL at 16:31

## 2019-10-14 RX ADMIN — OXYCODONE HYDROCHLORIDE 10 MILLIGRAM(S): 5 TABLET ORAL at 19:43

## 2019-10-14 RX ADMIN — ENOXAPARIN SODIUM 40 MILLIGRAM(S): 100 INJECTION SUBCUTANEOUS at 17:33

## 2019-10-14 RX ADMIN — Medication 100 GRAM(S): at 06:25

## 2019-10-14 RX ADMIN — OXYCODONE HYDROCHLORIDE 5 MILLIGRAM(S): 5 TABLET ORAL at 15:25

## 2019-10-14 RX ADMIN — OXYCODONE HYDROCHLORIDE 10 MILLIGRAM(S): 5 TABLET ORAL at 19:13

## 2019-10-14 RX ADMIN — OXYCODONE HYDROCHLORIDE 10 MILLIGRAM(S): 5 TABLET ORAL at 23:51

## 2019-10-14 RX ADMIN — Medication 650 MILLIGRAM(S): at 23:54

## 2019-10-14 RX ADMIN — HYDROMORPHONE HYDROCHLORIDE 0.5 MILLIGRAM(S): 2 INJECTION INTRAMUSCULAR; INTRAVENOUS; SUBCUTANEOUS at 14:20

## 2019-10-14 RX ADMIN — Medication 100 GRAM(S): at 20:55

## 2019-10-14 RX ADMIN — HYDROMORPHONE HYDROCHLORIDE 0.5 MILLIGRAM(S): 2 INJECTION INTRAMUSCULAR; INTRAVENOUS; SUBCUTANEOUS at 13:45

## 2019-10-14 RX ADMIN — HYDROMORPHONE HYDROCHLORIDE 0.5 MILLIGRAM(S): 2 INJECTION INTRAMUSCULAR; INTRAVENOUS; SUBCUTANEOUS at 13:30

## 2019-10-14 RX ADMIN — HYDROMORPHONE HYDROCHLORIDE 0.5 MILLIGRAM(S): 2 INJECTION INTRAMUSCULAR; INTRAVENOUS; SUBCUTANEOUS at 14:10

## 2019-10-14 RX ADMIN — Medication 650 MILLIGRAM(S): at 16:31

## 2019-10-14 RX ADMIN — Medication 650 MILLIGRAM(S): at 15:26

## 2019-10-14 RX ADMIN — Medication 137 MICROGRAM(S): at 05:12

## 2019-10-14 RX ADMIN — SODIUM CHLORIDE 125 MILLILITER(S): 9 INJECTION, SOLUTION INTRAVENOUS at 14:27

## 2019-10-14 NOTE — BRIEF OPERATIVE NOTE - OPERATION/FINDINGS
Very edematous gallbladder with multiple adhesions  spillage of bile and stones  irrigated copiously and visible stones removed

## 2019-10-14 NOTE — BRIEF OPERATIVE NOTE - NSICDXBRIEFPOSTOP_GEN_ALL_CORE_FT
POST-OP DIAGNOSIS:  Cholelithiasis with acute on chronic cholangitis 14-Oct-2019 13:43:56  Bhavya Bejarano
paresthesias/"hands and feet"

## 2019-10-14 NOTE — BRIEF OPERATIVE NOTE - NSICDXBRIEFPREOP_GEN_ALL_CORE_FT
PRE-OP DIAGNOSIS:  Cholelithiasis with acute on chronic cholecystitis 14-Oct-2019 13:43:25  Bhavya Bejarano

## 2019-10-14 NOTE — CHART NOTE - NSCHARTNOTEFT_GEN_A_CORE
ACS & TRAUMA SURGERY DAILY PROGRESS NOTE    Pre-op: Cholelithiasis  Surgeon: Dr. Somers  Procedure: Laparoscopic cholecystectomy    SUBJECTIVE:  - Patient seen and examined at bedside during morning rounds  - Patient states feeling well, tolerating clear liquid diet  - Denies abdominal pain, N/V, chest pain, SOB  - Has not passed gas or had BM  --------------------------------------------------------------------------------------------------  OBJECTIVE:   Physical Exam:  General: AAOx3, NAD, lying comfortably in bed  HEENT: NC/AT  Respiratory: nonlabored breathing  Cardiovascular: RRR, normal S1 and S2, no murmurs or gallops  Abdomen: distended, soft, appropriately tender, port site dressings c/d/i  Extremities: WWP, no edema  --------------------------------------------------------------------------------------------------  V/S:  Vital Signs Last 24 Hrs  T(C): 37.1 (14 Oct 2019 18:35), Max: 37.5 (14 Oct 2019 05:15)  T(F): 98.7 (14 Oct 2019 18:35), Max: 99.5 (14 Oct 2019 05:15)  HR: 75 (14 Oct 2019 18:35) (63 - 77)  BP: 102/69 (14 Oct 2019 18:35) (102/69 - 160/78)  BP(mean): 90 (14 Oct 2019 14:30) (87 - 112)  RR: 17 (14 Oct 2019 18:35) (14 - 18)  SpO2: 98% (14 Oct 2019 18:35) (95% - 100%)    --------------------------------------------------------------------------------------------------  I/Os:    13 Oct 2019 07:01  -  14 Oct 2019 07:00  --------------------------------------------------------  IN:    IV PiggyBack: 150 mL    lactated ringers.: 1500 mL  Total IN: 1650 mL    OUT:    Voided: 900 mL  Total OUT: 900 mL    Total NET: 750 mL      14 Oct 2019 07:01  -  14 Oct 2019 19:50  --------------------------------------------------------  IN:    Oral Fluid: 240 mL  Total IN: 240 mL    OUT:    Voided: 900 mL  Total OUT: 900 mL    Total NET: -660 mL        --------------------------------------------------------------------------------------------------  LABS:                        10.6   8.55  )-----------( 291      ( 14 Oct 2019 08:35 )             32.9     14 Oct 2019 07:10    139    |  101    |  8      ----------------------------<  94     3.8     |  23     |  0.69     Ca    8.0        14 Oct 2019 07:10  Phos  2.7       14 Oct 2019 07:10  Mg     1.8       14 Oct 2019 07:10    TPro  7.5    /  Alb  4.2    /  TBili  0.2    /  DBili  x      /  AST  17     /  ALT  17     /  AlkPhos  55     13 Oct 2019 12:52    PT/INR - ( 14 Oct 2019 09:23 )   PT: 11.7 sec;   INR: 1.03 ratio         PTT - ( 14 Oct 2019 09:23 )  PTT:32.4 sec  CAPILLARY BLOOD GLUCOSE            LIVER FUNCTIONS - ( 13 Oct 2019 12:52 )  Alb: 4.2 g/dL / Pro: 7.5 g/dL / ALK PHOS: 55 U/L / ALT: 17 U/L / AST: 17 U/L / GGT: x               --------------------------------------------------------------------------------------------------  MEDICATIONS  (STANDING):  cefoTEtan  IVPB 2 Gram(s) IV Intermittent every 12 hours  enoxaparin Injectable 40 milliGRAM(s) SubCutaneous daily  influenza   Vaccine 0.5 milliLiter(s) IntraMuscular once    MEDICATIONS  (PRN):  acetaminophen   Tablet .. 650 milliGRAM(s) Oral every 6 hours PRN Mild Pain (1 - 3)  oxyCODONE    IR 5 milliGRAM(s) Oral every 4 hours PRN Moderate Pain (4 - 6)  oxyCODONE    IR 10 milliGRAM(s) Oral every 4 hours PRN Severe Pain (7 - 10)

## 2019-10-15 ENCOUNTER — TRANSCRIPTION ENCOUNTER (OUTPATIENT)
Age: 39
End: 2019-10-15

## 2019-10-15 VITALS
RESPIRATION RATE: 18 BRPM | SYSTOLIC BLOOD PRESSURE: 107 MMHG | HEART RATE: 80 BPM | OXYGEN SATURATION: 96 % | TEMPERATURE: 99 F | DIASTOLIC BLOOD PRESSURE: 73 MMHG

## 2019-10-15 LAB
ALBUMIN SERPL ELPH-MCNC: 3.6 G/DL — SIGNIFICANT CHANGE UP (ref 3.3–5)
ALP SERPL-CCNC: 47 U/L — SIGNIFICANT CHANGE UP (ref 40–120)
ALT FLD-CCNC: 32 U/L — SIGNIFICANT CHANGE UP (ref 10–45)
ANION GAP SERPL CALC-SCNC: 11 MMOL/L — SIGNIFICANT CHANGE UP (ref 5–17)
AST SERPL-CCNC: 22 U/L — SIGNIFICANT CHANGE UP (ref 10–40)
BILIRUB SERPL-MCNC: 0.1 MG/DL — LOW (ref 0.2–1.2)
BUN SERPL-MCNC: 7 MG/DL — SIGNIFICANT CHANGE UP (ref 7–23)
CALCIUM SERPL-MCNC: 8.1 MG/DL — LOW (ref 8.4–10.5)
CHLORIDE SERPL-SCNC: 103 MMOL/L — SIGNIFICANT CHANGE UP (ref 96–108)
CO2 SERPL-SCNC: 26 MMOL/L — SIGNIFICANT CHANGE UP (ref 22–31)
CREAT SERPL-MCNC: 0.71 MG/DL — SIGNIFICANT CHANGE UP (ref 0.5–1.3)
GLUCOSE SERPL-MCNC: 130 MG/DL — HIGH (ref 70–99)
HCT VFR BLD CALC: 30.8 % — LOW (ref 34.5–45)
HCT VFR BLD CALC: 30.8 % — LOW (ref 34.5–45)
HGB BLD-MCNC: 10 G/DL — LOW (ref 11.5–15.5)
HGB BLD-MCNC: 9.7 G/DL — LOW (ref 11.5–15.5)
MAGNESIUM SERPL-MCNC: 2 MG/DL — SIGNIFICANT CHANGE UP (ref 1.6–2.6)
MCHC RBC-ENTMCNC: 25.9 PG — LOW (ref 27–34)
MCHC RBC-ENTMCNC: 26.3 PG — LOW (ref 27–34)
MCHC RBC-ENTMCNC: 31.5 GM/DL — LOW (ref 32–36)
MCHC RBC-ENTMCNC: 32.5 GM/DL — SIGNIFICANT CHANGE UP (ref 32–36)
MCV RBC AUTO: 81.1 FL — SIGNIFICANT CHANGE UP (ref 80–100)
MCV RBC AUTO: 82.1 FL — SIGNIFICANT CHANGE UP (ref 80–100)
NRBC # BLD: 0 /100 WBCS — SIGNIFICANT CHANGE UP (ref 0–0)
PHOSPHATE SERPL-MCNC: 1.8 MG/DL — LOW (ref 2.5–4.5)
PLATELET # BLD AUTO: 268 K/UL — SIGNIFICANT CHANGE UP (ref 150–400)
PLATELET # BLD AUTO: 284 K/UL — SIGNIFICANT CHANGE UP (ref 150–400)
POTASSIUM SERPL-MCNC: 3.6 MMOL/L — SIGNIFICANT CHANGE UP (ref 3.5–5.3)
POTASSIUM SERPL-SCNC: 3.6 MMOL/L — SIGNIFICANT CHANGE UP (ref 3.5–5.3)
PROT SERPL-MCNC: 6.6 G/DL — SIGNIFICANT CHANGE UP (ref 6–8.3)
RBC # BLD: 3.75 M/UL — LOW (ref 3.8–5.2)
RBC # BLD: 3.8 M/UL — SIGNIFICANT CHANGE UP (ref 3.8–5.2)
RBC # FLD: 15.4 % — HIGH (ref 10.3–14.5)
RBC # FLD: 15.4 % — HIGH (ref 10.3–14.5)
SODIUM SERPL-SCNC: 140 MMOL/L — SIGNIFICANT CHANGE UP (ref 135–145)
WBC # BLD: 8.38 K/UL — SIGNIFICANT CHANGE UP (ref 3.8–10.5)
WBC # BLD: 8.9 K/UL — SIGNIFICANT CHANGE UP (ref 3.8–10.5)
WBC # FLD AUTO: 8.38 K/UL — SIGNIFICANT CHANGE UP (ref 3.8–10.5)
WBC # FLD AUTO: 8.9 K/UL — SIGNIFICANT CHANGE UP (ref 3.8–10.5)

## 2019-10-15 PROCEDURE — 80053 COMPREHEN METABOLIC PANEL: CPT

## 2019-10-15 PROCEDURE — 85610 PROTHROMBIN TIME: CPT

## 2019-10-15 PROCEDURE — 76705 ECHO EXAM OF ABDOMEN: CPT

## 2019-10-15 PROCEDURE — 84100 ASSAY OF PHOSPHORUS: CPT

## 2019-10-15 PROCEDURE — 80048 BASIC METABOLIC PNL TOTAL CA: CPT

## 2019-10-15 PROCEDURE — 86900 BLOOD TYPING SEROLOGIC ABO: CPT

## 2019-10-15 PROCEDURE — 84702 CHORIONIC GONADOTROPIN TEST: CPT

## 2019-10-15 PROCEDURE — C1889: CPT

## 2019-10-15 PROCEDURE — 86850 RBC ANTIBODY SCREEN: CPT

## 2019-10-15 PROCEDURE — 85027 COMPLETE CBC AUTOMATED: CPT

## 2019-10-15 PROCEDURE — 96375 TX/PRO/DX INJ NEW DRUG ADDON: CPT

## 2019-10-15 PROCEDURE — 88304 TISSUE EXAM BY PATHOLOGIST: CPT

## 2019-10-15 PROCEDURE — 86901 BLOOD TYPING SEROLOGIC RH(D): CPT

## 2019-10-15 PROCEDURE — 85730 THROMBOPLASTIN TIME PARTIAL: CPT

## 2019-10-15 PROCEDURE — 99285 EMERGENCY DEPT VISIT HI MDM: CPT | Mod: 25

## 2019-10-15 PROCEDURE — 83690 ASSAY OF LIPASE: CPT

## 2019-10-15 PROCEDURE — 96376 TX/PRO/DX INJ SAME DRUG ADON: CPT

## 2019-10-15 PROCEDURE — 83735 ASSAY OF MAGNESIUM: CPT

## 2019-10-15 PROCEDURE — 96374 THER/PROPH/DIAG INJ IV PUSH: CPT

## 2019-10-15 RX ORDER — LEVOTHYROXINE SODIUM 125 MCG
137 TABLET ORAL DAILY
Refills: 0 | Status: DISCONTINUED | OUTPATIENT
Start: 2019-10-15 | End: 2019-10-15

## 2019-10-15 RX ORDER — ACETAMINOPHEN 500 MG
2 TABLET ORAL
Qty: 0 | Refills: 0 | DISCHARGE
Start: 2019-10-15

## 2019-10-15 RX ORDER — SODIUM,POTASSIUM PHOSPHATES 278-250MG
1 POWDER IN PACKET (EA) ORAL ONCE
Refills: 0 | Status: COMPLETED | OUTPATIENT
Start: 2019-10-15 | End: 2019-10-15

## 2019-10-15 RX ORDER — OXYCODONE HYDROCHLORIDE 5 MG/1
1 TABLET ORAL
Qty: 10 | Refills: 0
Start: 2019-10-15 | End: 2019-10-17

## 2019-10-15 RX ORDER — OXYCODONE HYDROCHLORIDE 5 MG/1
1 TABLET ORAL
Qty: 9 | Refills: 0
Start: 2019-10-15 | End: 2019-10-17

## 2019-10-15 RX ADMIN — Medication 650 MILLIGRAM(S): at 12:37

## 2019-10-15 RX ADMIN — Medication 100 GRAM(S): at 07:31

## 2019-10-15 RX ADMIN — OXYCODONE HYDROCHLORIDE 10 MILLIGRAM(S): 5 TABLET ORAL at 06:20

## 2019-10-15 RX ADMIN — ENOXAPARIN SODIUM 40 MILLIGRAM(S): 100 INJECTION SUBCUTANEOUS at 11:14

## 2019-10-15 RX ADMIN — Medication 650 MILLIGRAM(S): at 06:20

## 2019-10-15 RX ADMIN — OXYCODONE HYDROCHLORIDE 10 MILLIGRAM(S): 5 TABLET ORAL at 05:51

## 2019-10-15 RX ADMIN — Medication 137 MICROGRAM(S): at 05:58

## 2019-10-15 RX ADMIN — OXYCODONE HYDROCHLORIDE 10 MILLIGRAM(S): 5 TABLET ORAL at 01:22

## 2019-10-15 RX ADMIN — Medication 650 MILLIGRAM(S): at 01:22

## 2019-10-15 RX ADMIN — Medication 650 MILLIGRAM(S): at 05:49

## 2019-10-15 RX ADMIN — Medication 62.5 MILLIMOLE(S): at 11:13

## 2019-10-15 RX ADMIN — Medication 1 PACKET(S): at 09:59

## 2019-10-15 RX ADMIN — Medication 650 MILLIGRAM(S): at 13:10

## 2019-10-15 NOTE — PROGRESS NOTE ADULT - ATTENDING COMMENTS
Patient seen and examined and agree with above.  s/p POD #1 laparoscopic cholecystectomy  Pain controlled  Hemodynamically appropriate  Tolerating diet  Incisions are clean, dry and intact  Labs reviewed  H/H stable  LFTs normal  Patient for discharge to home today.  Ambulating well  Will follow up with me in 2 weeks.

## 2019-10-15 NOTE — PROGRESS NOTE ADULT - SUBJECTIVE AND OBJECTIVE BOX
ACS SURGERY DAILY PROGRESS NOTE:       SUBJECTIVE/ROS: Patient seen and examined at bedside.  Her pain is well controlled.  She is tolerating a regular diet.  No acute overnight events.         MEDICATIONS  (STANDING):  cefoTEtan  IVPB 2 Gram(s) IV Intermittent every 12 hours  enoxaparin Injectable 40 milliGRAM(s) SubCutaneous daily  influenza   Vaccine 0.5 milliLiter(s) IntraMuscular once  levothyroxine 137 MICROGram(s) Oral daily    MEDICATIONS  (PRN):  acetaminophen   Tablet .. 650 milliGRAM(s) Oral every 6 hours PRN Mild Pain (1 - 3)  oxyCODONE    IR 5 milliGRAM(s) Oral every 4 hours PRN Moderate Pain (4 - 6)  oxyCODONE    IR 10 milliGRAM(s) Oral every 4 hours PRN Severe Pain (7 - 10)      OBJECTIVE:    Vital Signs Last 24 Hrs  T(C): 37.1 (15 Oct 2019 04:27), Max: 37.2 (15 Oct 2019 01:00)  T(F): 98.7 (15 Oct 2019 04:27), Max: 99 (15 Oct 2019 01:00)  HR: 70 (15 Oct 2019 04:27) (65 - 77)  BP: 101/63 (15 Oct 2019 04:27) (97/63 - 160/78)  BP(mean): 90 (14 Oct 2019 14:30) (87 - 112)  RR: 18 (15 Oct 2019 04:27) (14 - 18)  SpO2: 98% (15 Oct 2019 04:27) (95% - 99%)        I&O's Detail    14 Oct 2019 07:01  -  15 Oct 2019 07:00  --------------------------------------------------------  IN:    IV PiggyBack: 50 mL    Oral Fluid: 240 mL  Total IN: 290 mL    OUT:    Voided: 1750 mL  Total OUT: 1750 mL    Total NET: -1460 mL          Daily Height in cm: 162.56 (14 Oct 2019 09:16)    Daily     LABS:                        10.6   8.55  )-----------( 291      ( 14 Oct 2019 08:35 )             32.9     10-14    139  |  101  |  8   ----------------------------<  94  3.8   |  23  |  0.69    Ca    8.0<L>      14 Oct 2019 07:10  Phos  2.7     10-14  Mg     1.8     10-14    TPro  7.5  /  Alb  4.2  /  TBili  0.2  /  DBili  x   /  AST  17  /  ALT  17  /  AlkPhos  55  10-13    PT/INR - ( 14 Oct 2019 09:23 )   PT: 11.7 sec;   INR: 1.03 ratio         PTT - ( 14 Oct 2019 09:23 )  PTT:32.4 sec              PHYSICAL EXAM:  Constitutional: well developed, well nourished, NAD  Respiratory: No increased WOB  Abdomen: softly distended, appropriate tenderness.  Incisions with dressings c/d/i.  Psych A&Ox3

## 2019-10-15 NOTE — PROGRESS NOTE ADULT - ASSESSMENT
40 yo female with known gallstone and biliary colic, thyroid cancer s/p thyroidectomy POD #1 s/p laparoscopic cholecystectomy for acute cholecystitis    - Pain control  - Regular diet  - f/u AM labs  - DVT ppx  - 24hrs abx post-op  - Dispo: Home today pending AM labs      [phone]

## 2019-10-15 NOTE — DISCHARGE NOTE PROVIDER - HOSPITAL COURSE
Patient is a 39 years old female with endometriosis on OCP, known gallstone diagnosed 2 years ago with biliary colic, thyroid cancer s/p thyroidectomy years ago presented with acute abdominal pain. She states that her pain started 4 pm yesterday, and has persisted unlike her prior episodes. She described the pain as burning, localized to the right upper quadrant radiating to the back. She denies nausea, vomiting, fever or chills. Of note, she had an EGD 2 years ago by her GI Dr. Mckeon and no ulcer was found.         EXAM:  US ABDOMEN RT UPR QUADRANT                          FINDINGS:    Liver: Within normal limits.    Bile ducts: Normal caliber. The common bile duct measures up to 7 mm.    Gallbladder: Multiple nonmobile stone and gallbladder wall thickening measuring up to 0.6 cm. Patient received pain medication.     Pancreas: Visualized portions are within normal limits.    Right kidney: 11.3 cm. No hydronephrosis.    Ascites: None.    Aorta/IVC: Visualized portions are within normal limits.    IMPRESSION: Gallstones.        Given the history, exam and imaging, patient is suspected to have acute cholecystitis.  The patient was admitted to the Trauma surgery service, made NPO/IVF, with IV abx.     The patient was taken to the OR and underwent a laparoscopic cholecystectomy. The patient tolerated the procedure well without complications, was extubated, and transferred to the PACU in stable condition. In the PACU, the patients' pain was controlled, vitals stable, tolerating PO, and voiding appropriately.  The patient was transferred to the surgical floor in stable condition.         On day of discharge, the patient was tolerating diet, ambulating well and pain controlled. The patient will be discharged home with outpatient follow up with Dr. Somers within 1-2 weeks. Patient is a 39 years old female with endometriosis on OCP, known gallstone diagnosed 2 years ago with biliary colic, thyroid cancer s/p thyroidectomy years ago presented with acute abdominal pain. She states that her pain started 4 pm yesterday, and has persisted unlike her prior episodes. She described the pain as burning, localized to the right upper quadrant radiating to the back. She denies nausea, vomiting, fever or chills. Of note, she had an EGD 2 years ago by her GI Dr. Mckeon and no ulcer was found.         EXAM:  US ABDOMEN RT UPR QUADRANT                          FINDINGS:    Liver: Within normal limits.    Bile ducts: Normal caliber. The common bile duct measures up to 7 mm.    Gallbladder: Multiple nonmobile stone and gallbladder wall thickening measuring up to 0.6 cm. Patient received pain medication.     Pancreas: Visualized portions are within normal limits.    Right kidney: 11.3 cm. No hydronephrosis.    Ascites: None.    Aorta/IVC: Visualized portions are within normal limits.    IMPRESSION: Gallstones.        Given the history, exam and imaging, patient is suspected to have acute cholecystitis.  The patient was admitted to the Trauma surgery service, made NPO/IVF, with IV abx.     The patient was taken to the OR and underwent a laparoscopic cholecystectomy. The patient tolerated the procedure well without complications, was extubated, and transferred to the PACU in stable condition. In the PACU, the patients' pain was controlled, vitals stable, tolerating PO, and voiding appropriately.  The patient was transferred to the surgical floor in stable condition.         On day of discharge, the patient was tolerating diet, ambulating well and pain controlled. Before discharge, the patient was treated for hypophosphatemia with oral and intravenous phosphate replacements.  The patient will be discharged home with outpatient follow up with Dr. Somers within 1-2 weeks.

## 2019-10-15 NOTE — DISCHARGE NOTE NURSING/CASE MANAGEMENT/SOCIAL WORK - PATIENT PORTAL LINK FT
You can access the FollowMyHealth Patient Portal offered by [facility] by registering at the following website: [email or web address]. By joining [facility] FollowMyHealth portal, you will also be able to view your health information using other applications (apps) compatible with our system.

## 2019-10-15 NOTE — DISCHARGE NOTE PROVIDER - CARE PROVIDER_API CALL
Marina Somers (MD)  Surgery; Surgical Critical Care  [address], [address], [place] [address]  Phone: [phone]  Fax: [phone]  Follow Up Time: 2 weeks

## 2019-10-15 NOTE — DISCHARGE NOTE PROVIDER - NSDCCPCAREPLAN_GEN_ALL_CORE_FT
PRINCIPAL DISCHARGE DIAGNOSIS  Diagnosis: Cholelithiasis  Assessment and Plan of Treatment: status-post laparoscopic cholecystectomy  WOUND CARE: You may shower. Pat Dry abdomen. Leave the white steri strips in place, they will fall off on their own in approximately 5-7 days.   BATHING: Please do not submerge wound underwater. You may shower and/or sponge bathe.  ACTIVITY: No heavy lifting anything more than 10-15lbs or straining. Otherwise, you may return to your usual level of physical activity. If you are taking narcotic pain medication (such as Percocet), do NOT drive a car, operate machinery or make important decisions.  DIET: Low fiber diet  NOTIFY YOUR SURGEON IF: You have any bleeding that does not stop, any pus draining from your wound, any fever (over 100.4 F) or chills, persistent nausea/vomiting with inability to tolerate food or liquids, persistent diarrhea, or if your pain is not controlled on your discharge pain medications.  FOLLOW-UP:  1. Please call to make a follow-up appointment within one week of discharge   2. Please follow up with your primary care physician in one week regarding your hospitalization.      SECONDARY DISCHARGE DIAGNOSES  Diagnosis: Thyroid cancer  Assessment and Plan of Treatment: s/p thyroidectomy    Diagnosis: Endometriosis  Assessment and Plan of Treatment: Please take home medications as directed and follow up with your PCP as an outpatient

## 2019-10-17 LAB — SURGICAL PATHOLOGY STUDY: SIGNIFICANT CHANGE UP

## 2019-10-18 PROBLEM — N80.9 ENDOMETRIOSIS, UNSPECIFIED: Chronic | Status: ACTIVE | Noted: 2019-10-13

## 2019-10-18 PROBLEM — E03.9 HYPOTHYROIDISM, UNSPECIFIED: Chronic | Status: ACTIVE | Noted: 2019-10-13

## 2019-10-18 PROBLEM — C73 MALIGNANT NEOPLASM OF THYROID GLAND: Chronic | Status: ACTIVE | Noted: 2019-10-13

## 2019-10-21 ENCOUNTER — APPOINTMENT (OUTPATIENT)
Dept: TRAUMA SURGERY | Facility: CLINIC | Age: 39
End: 2019-10-21

## 2019-10-21 VITALS
WEIGHT: 170 LBS | HEIGHT: 64 IN | SYSTOLIC BLOOD PRESSURE: 109 MMHG | HEART RATE: 61 BPM | BODY MASS INDEX: 29.02 KG/M2 | TEMPERATURE: 97.8 F | DIASTOLIC BLOOD PRESSURE: 72 MMHG

## 2021-09-26 NOTE — H&P ADULT - NSICDXFAMILYHX_GEN_ALL_CORE_FT
FAMILY HISTORY:  Family history of coronary artery disease
I will START or STAY ON the medications listed below when I get home from the hospital:  None

## 2022-10-12 NOTE — DISCHARGE NOTE PROVIDER - DISCHARGE DATE
15-Oct-2019 Borderline low sodium at 133, normal TSH  Continue current dose of levothyroxine  Recheck TSH in 6 months   Written by Pam Vasquez MD on 10/5/2022 10:01 PM CDT    Called and spoke with pt's son, denise Cummins Bigg per HIPAA), to inform PCP is seeing pts in clinic. Reviewed lab results as indicated- son indicated will have pt continue same Levothyroxine dose- 100mcg as directed will recheck ordered TSH in 6 months (4/6/23). No further questions or concerns. Son verbalized understanding and agreed with POC.

## 2024-04-12 NOTE — ED ADULT NURSE NOTE - CAS TRG [facility]AL [facility], MLM
Spoke to patient in regarding NP appointment with  Dr. Ricardo.    Patient has seen a Vascular Provider before?  No    Any recent testing (labs or imaging) outside of [facility]?  No    Were any records requested?  No    New patient packet sent via [facility] or mail?  Yes  Correct appointment type (New/Established/virtual)? Yes  Referral attached to appointment (renewals and New patients only)? Yes  
Patent

## 2024-07-02 ENCOUNTER — NON-APPOINTMENT (OUTPATIENT)
Age: 44
End: 2024-07-02

## 2024-07-03 ENCOUNTER — APPOINTMENT (OUTPATIENT)
Dept: ORTHOPEDIC SURGERY | Facility: CLINIC | Age: 44
End: 2024-07-03
Payer: COMMERCIAL

## 2024-07-03 ENCOUNTER — APPOINTMENT (OUTPATIENT)
Dept: ORTHOPEDIC SURGERY | Facility: CLINIC | Age: 44
End: 2024-07-03

## 2024-07-03 ENCOUNTER — NON-APPOINTMENT (OUTPATIENT)
Age: 44
End: 2024-07-03

## 2024-07-03 VITALS — WEIGHT: 175 LBS | BODY MASS INDEX: 29.88 KG/M2 | HEIGHT: 64 IN

## 2024-07-03 DIAGNOSIS — M75.32 CALCIFIC TENDINITIS OF LEFT SHOULDER: ICD-10-CM

## 2024-07-03 DIAGNOSIS — M25.512 PAIN IN LEFT SHOULDER: ICD-10-CM

## 2024-07-03 PROCEDURE — 99203 OFFICE O/P NEW LOW 30 MIN: CPT | Mod: 25

## 2024-07-03 PROCEDURE — 20611 DRAIN/INJ JOINT/BURSA W/US: CPT | Mod: LT

## 2024-07-03 PROCEDURE — 73030 X-RAY EXAM OF SHOULDER: CPT | Mod: LT

## 2024-10-07 ENCOUNTER — APPOINTMENT (OUTPATIENT)
Dept: ORTHOPEDIC SURGERY | Facility: CLINIC | Age: 44
End: 2024-10-07
Payer: COMMERCIAL

## 2024-10-07 VITALS — WEIGHT: 175 LBS | BODY MASS INDEX: 29.88 KG/M2 | HEIGHT: 64 IN

## 2024-10-07 DIAGNOSIS — M75.02 ADHESIVE CAPSULITIS OF LEFT SHOULDER: ICD-10-CM

## 2024-10-07 DIAGNOSIS — M25.512 PAIN IN LEFT SHOULDER: ICD-10-CM

## 2024-10-07 PROCEDURE — 99213 OFFICE O/P EST LOW 20 MIN: CPT
